# Patient Record
Sex: MALE | Race: BLACK OR AFRICAN AMERICAN | NOT HISPANIC OR LATINO | URBAN - METROPOLITAN AREA
[De-identification: names, ages, dates, MRNs, and addresses within clinical notes are randomized per-mention and may not be internally consistent; named-entity substitution may affect disease eponyms.]

---

## 2018-12-23 ENCOUNTER — OFFICE VISIT (OUTPATIENT)
Dept: URGENT CARE | Facility: URGENT CARE | Age: 23
End: 2018-12-23
Payer: COMMERCIAL

## 2018-12-23 VITALS
HEART RATE: 78 BPM | HEIGHT: 68 IN | DIASTOLIC BLOOD PRESSURE: 60 MMHG | BODY MASS INDEX: 22.28 KG/M2 | TEMPERATURE: 98 F | SYSTOLIC BLOOD PRESSURE: 104 MMHG | WEIGHT: 147 LBS

## 2018-12-23 DIAGNOSIS — L08.9 WOUND INFECTION: Primary | ICD-10-CM

## 2018-12-23 DIAGNOSIS — T14.8XXA WOUND INFECTION: Primary | ICD-10-CM

## 2018-12-23 PROCEDURE — 99202 OFFICE O/P NEW SF 15 MIN: CPT | Performed by: FAMILY MEDICINE

## 2018-12-23 RX ORDER — SULFAMETHOXAZOLE/TRIMETHOPRIM 800-160 MG
1 TABLET ORAL 2 TIMES DAILY
Qty: 20 TABLET | Refills: 0 | Status: SHIPPED | OUTPATIENT
Start: 2018-12-23 | End: 2019-01-02

## 2018-12-23 ASSESSMENT — MIFFLIN-ST. JEOR: SCORE: 1641.29

## 2018-12-23 NOTE — PROGRESS NOTES
Subjective: See notes from the emergency room.  He had 11 stitches put in.  There has been pus coming out from the central portion.  It has gotten more painful.  He does not feel sick.    Objective: Forearm has a 11 sutures and in the central area while there is not a pocket of pus there is obvious pussy discharge.  I took out 3 of the stitches in that area left in the rest of them because they are in an area that does not look infected    Assessment and plan: Wound infection.  Will treat with Septra and the removal of 3 stitches ought to help.  If it keeps getting worse more of the stitches will need to be removed, otherwise come back and have them taken out on Thursday as planned.